# Patient Record
Sex: MALE | Race: WHITE
[De-identification: names, ages, dates, MRNs, and addresses within clinical notes are randomized per-mention and may not be internally consistent; named-entity substitution may affect disease eponyms.]

---

## 2020-10-10 ENCOUNTER — HOSPITAL ENCOUNTER (EMERGENCY)
Dept: HOSPITAL 75 - ER FS | Age: 60
Discharge: HOME | End: 2020-10-10
Payer: COMMERCIAL

## 2020-10-10 VITALS — BODY MASS INDEX: 26.45 KG/M2 | HEIGHT: 70.98 IN | WEIGHT: 188.94 LBS

## 2020-10-10 VITALS — SYSTOLIC BLOOD PRESSURE: 161 MMHG | DIASTOLIC BLOOD PRESSURE: 95 MMHG

## 2020-10-10 DIAGNOSIS — R51.9: ICD-10-CM

## 2020-10-10 DIAGNOSIS — I10: Primary | ICD-10-CM

## 2020-10-10 DIAGNOSIS — F17.210: ICD-10-CM

## 2020-10-10 LAB
ALBUMIN SERPL-MCNC: 4.5 GM/DL (ref 3.2–4.5)
ALP SERPL-CCNC: 83 U/L (ref 40–136)
ALT SERPL-CCNC: 25 U/L (ref 0–55)
BASOPHILS # BLD AUTO: 0.1 10^3/UL (ref 0–0.1)
BASOPHILS NFR BLD AUTO: 1 % (ref 0–10)
BILIRUB SERPL-MCNC: 0.3 MG/DL (ref 0.1–1)
BUN/CREAT SERPL: 22
CALCIUM SERPL-MCNC: 9.9 MG/DL (ref 8.5–10.1)
CHLORIDE SERPL-SCNC: 102 MMOL/L (ref 98–107)
CO2 SERPL-SCNC: 24 MMOL/L (ref 21–32)
CREAT SERPL-MCNC: 0.72 MG/DL (ref 0.6–1.3)
EOSINOPHIL # BLD AUTO: 0.2 10^3/UL (ref 0–0.3)
EOSINOPHIL NFR BLD AUTO: 3 % (ref 0–10)
GFR SERPLBLD BASED ON 1.73 SQ M-ARVRAT: > 60 ML/MIN
GLUCOSE SERPL-MCNC: 175 MG/DL (ref 70–105)
HCT VFR BLD CALC: 44 % (ref 40–54)
HGB BLD-MCNC: 15 G/DL (ref 13.3–17.7)
LYMPHOCYTES # BLD AUTO: 1.5 X 10^3 (ref 1–4)
LYMPHOCYTES NFR BLD AUTO: 25 % (ref 12–44)
MANUAL DIFFERENTIAL PERFORMED BLD QL: NO
MCH RBC QN AUTO: 29 PG (ref 25–34)
MCHC RBC AUTO-ENTMCNC: 34 G/DL (ref 32–36)
MCV RBC AUTO: 85 FL (ref 80–99)
MONOCYTES # BLD AUTO: 0.4 X 10^3 (ref 0–1)
MONOCYTES NFR BLD AUTO: 6 % (ref 0–12)
NEUTROPHILS # BLD AUTO: 4 X 10^3 (ref 1.8–7.8)
NEUTROPHILS NFR BLD AUTO: 65 % (ref 42–75)
PLATELET # BLD: 263 10^3/UL (ref 130–400)
PMV BLD AUTO: 9.3 FL (ref 7.4–10.4)
POTASSIUM SERPL-SCNC: 4 MMOL/L (ref 3.6–5)
PROT SERPL-MCNC: 7.6 GM/DL (ref 6.4–8.2)
SODIUM SERPL-SCNC: 139 MMOL/L (ref 135–145)
WBC # BLD AUTO: 6.1 10^3/UL (ref 4.3–11)

## 2020-10-10 PROCEDURE — 36415 COLL VENOUS BLD VENIPUNCTURE: CPT

## 2020-10-10 PROCEDURE — 85025 COMPLETE CBC W/AUTO DIFF WBC: CPT

## 2020-10-10 PROCEDURE — 70450 CT HEAD/BRAIN W/O DYE: CPT

## 2020-10-10 PROCEDURE — 80053 COMPREHEN METABOLIC PANEL: CPT

## 2020-10-10 NOTE — ED GENERAL
General


Chief Complaint:  Cardiac/General Problems


Stated Complaint:  HEADACHE, HIGH BP


Nursing Triage Note:  


Patient presents to the ED with c/o of high blood pressure and headaches. He 


states that he was seen at [facility] walk in clinic this morning and sent to the ED 


for further evaluation because his blood pressure was elevated. He also reports 


that he has been experiencing headaches every morning for the past 7 days.


Nursing Sepsis Screen:  No Definite Risk





History of Present Illness


Date Seen by Provider:  Oct 10, 2020


Time Seen by Provider:  12:14


Initial Comments


Patient presenting to emergency department for evaluation of a headache that has

been going on for approximately one week and he describes it as a diffuse 

pounding sensation throughout his entire head that he wakes up with it in the 

morning and then he takes ibuprofen and the pain goes away.  He says he doesn't 

have it all day again until before he goes to sleep and feels a headache again. 

He denies any other symptoms of fevers chills nausea vomiting vision changes 

neck stiffness unilateral weakness numbness or tingling.  He went to the urgent 

care and they told him to come here because his blood pressure was in the 

200/120 range.  He denies ever being diagnosed with hypertension denies having a

physician or having any diagnosed medical problems.  He denies any chest pain 

shortness of breath decreased urination or other symptoms associated with his 

hypertension.





Allergies and Home Medications


Allergies


Coded Allergies:  


     No Known Drug Allergies (Unverified , 10/10/20)





Home Medications


Lisinopril 40 Mg Tablet, 40 MG PO DAILY


   Prescribed by: GAYLE MONREAL on 10/10/20 1201





Patient Home Medication List


Home Medication List Reviewed:  Yes





Review of Systems


Review of Systems


Constitutional:  no symptoms reported


EENTM:  no symptoms reported


Respiratory:  no symptoms reported


Cardiovascular:  no symptoms reported


Gastrointestinal:  no symptoms reported


Genitourinary:  no symptoms reported


Musculoskeletal:  no symptoms reported


Skin:  no symptoms reported


Psychiatric/Neurological:  Headache





All Other Systems Reviewed


Negative Unless Noted:  Yes





Past Medical-Social-Family Hx


Patient Social History


Alcohol Use:  Rarely Uses


Recreational Drug Use:  No


Smoking Status:  Current Everyday Smoker


Type Used:  Cigarettes


2nd Hand Smoke Exposure:  No


Recent Foreign Travel:  No


Contact w/Someone Who Travel:  No


Recent Infectious Disease Expo:  No


Recent Hopitalizations:  No


Physical Abuse:  No


Sexual Abuse:  No


Mistreated:  No


Fear:  No





Seasonal Allergies


Seasonal Allergies:  No





Past Medical History


Surgeries:  Yes (Hernia repair)


Abdominal, Appendectomy


Respiratory:  No


Cardiac:  No


Neurological:  No


Genitourinary:  No


Gastrointestinal:  No


Musculoskeletal:  No


Endocrine:  No


HEENT:  No


Cancer:  No


Psychosocial:  No


Integumentary:  No


Blood Disorders:  No





Physical Exam


Vital Signs





Vital Signs - First Documented








 10/10/20





 10:43


 


Temp 36.4


 


Pulse 74


 


Resp 16


 


B/P (MAP) 193/112 (139)


 


Pulse Ox 98


 


O2 Delivery Room Air





Capillary Refill : Less Than 3 Seconds


Height, Weight, BMI


Height: '"


Weight: lbs. oz. kg; 26.00 BMI


Method:


General Appearance:  No Apparent Distress, WD/WN


HEENT:  PERRL/EOMI


Neck:  Supple


Respiratory:  No Respiratory Distress


Cardiovascular:  Regular Rate, Rhythm


Gastrointestinal:  Non Tender, Soft


Back:  No Vertebral Tenderness


Extremity:  Normal Capillary Refill


Neurologic/Psychiatric:  Alert, Oriented x3, No Motor/Sensory Deficits





Progress/Results/Core Measures


Suspected Sepsis


Recent Fever Within 48 Hours:  No


Infection Criteria Present:  None


New/Unexplained  Altered Menta:  No


Sepsis Screen:  No Definite Risk


SIRS


Temperature: 


Pulse: 74 


Respiratory Rate: 16


 


Laboratory Tests


10/10/20 11:20: White Blood Count 6.1


Blood Pressure 193 /112 


Mean: 139


 


Laboratory Tests


10/10/20 11:20: 


Creatinine 0.72, Platelet Count 263, Total Bilirubin 0.3








Results/Orders


Lab Results





Laboratory Tests








Test


 10/10/20


11:20 Range/Units


 


 


White Blood Count


 6.1 


 4.3-11.0


10^3/uL


 


Red Blood Count


 5.12 


 4.35-5.85


10^6/uL


 


Hemoglobin 15.0  13.3-17.7  G/DL


 


Hematocrit 44  40-54  %


 


Mean Corpuscular Volume 85  80-99  FL


 


Mean Corpuscular Hemoglobin 29  25-34  PG


 


Mean Corpuscular Hemoglobin


Concent 34 


 32-36  G/DL





 


Red Cell Distribution Width 13.3  10.0-14.5  %


 


Platelet Count


 263 


 130-400


10^3/uL


 


Mean Platelet Volume 9.3  7.4-10.4  FL


 


Immature Granulocyte % (Auto) 0   %


 


Neutrophils (%) (Auto) 65  42-75  %


 


Lymphocytes (%) (Auto) 25  12-44  %


 


Monocytes (%) (Auto) 6  0-12  %


 


Eosinophils (%) (Auto) 3  0-10  %


 


Basophils (%) (Auto) 1  0-10  %


 


Neutrophils # (Auto) 4.0  1.8-7.8  X 10^3


 


Lymphocytes # (Auto) 1.5  1.0-4.0  X 10^3


 


Monocytes # (Auto) 0.4  0.0-1.0  X 10^3


 


Eosinophils # (Auto)


 0.2 


 0.0-0.3


10^3/uL


 


Basophils # (Auto)


 0.1 


 0.0-0.1


10^3/uL


 


Immature Granulocyte # (Auto)


 0.0 


 0.0-0.1


10^3/uL


 


Sodium Level 139  135-145  MMOL/L


 


Potassium Level 4.0  3.6-5.0  MMOL/L


 


Chloride Level 102    MMOL/L


 


Carbon Dioxide Level 24  21-32  MMOL/L


 


Anion Gap 13  5-14  MMOL/L


 


Blood Urea Nitrogen 16  7-18  MG/DL


 


Creatinine


 0.72 


 0.60-1.30


MG/DL


 


Estimat Glomerular Filtration


Rate > 60 


  





 


BUN/Creatinine Ratio 22   


 


Glucose Level 175 H   MG/DL


 


Calcium Level 9.9  8.5-10.1  MG/DL


 


Corrected Calcium 9.5  8.5-10.1  MG/DL


 


Total Bilirubin 0.3  0.1-1.0  MG/DL


 


Aspartate Amino Transf


(AST/SGOT) 21 


 5-34  U/L





 


Alanine Aminotransferase


(ALT/SGPT) 25 


 0-55  U/L





 


Alkaline Phosphatase 83    U/L


 


Total Protein 7.6  6.4-8.2  GM/DL


 


Albumin 4.5  3.2-4.5  GM/DL








My Orders





Orders - GAYLE MONREAL DO


Ct Head Wo (10/10/20 10:50)


Iv/Invasive Line Insertion .IV start (10/10/20 10:50)


Cbc With Automated Diff (10/10/20 10:50)


Comprehensive Metabolic Panel (10/10/20 10:50)


Ketorolac Injection (Toradol Injection) (10/10/20 11:00)


Morphine  Injection (Morphine  Injection (10/10/20 10:50)


Ondansetron Injection (Zofran Injectio (10/10/20 11:00)


Enalaprilat Inj (Vasotec Inj) (10/10/20 11:00)


Lisinopril  Tablet (Zestril Tablet) (10/10/20 11:00)





Medications Given in ED





Current Medications








 Medications  Dose


 Ordered  Sig/Teresita


 Route  Start Time


 Stop Time Status Last Admin


Dose Admin


 


 Enalaprilat  2.5 mg  ONCE  ONCE


 IV  10/10/20 11:00


 10/10/20 11:01 DC 10/10/20 11:22


2.5 MG


 


 Ketorolac


 Tromethamine  15 mg  ONCE  ONCE


 IVP  10/10/20 11:00


 10/10/20 11:01 DC 10/10/20 11:22


15 MG


 


 Lisinopril  40 mg  ONCE  ONCE


 PO  10/10/20 11:00


 10/10/20 11:01 DC 10/10/20 11:21


40 MG








Vital Signs/I&O











 10/10/20 10/10/20





 10:43 12:06


 


Temp 36.4 36.4


 


Pulse 74 66


 


Resp 16 16


 


B/P (MAP) 193/112 (139) 161/95 (139)


 


Pulse Ox 98 96


 


O2 Delivery Room Air 





Capillary Refill : Less Than 3 Seconds








Blood Pressure Mean:                    139








Progress Note :  


Progress Note


Patient with a headache that I'm not entirely certain is related to his 

hypertension.  Patient given Toradol enalapril and lisinopril and his blood 

pressure improved to the 155/90 range and I did not want to drop his blood 

pressure any further.  Patient told to stop smoking and take a baby aspirin 

daily and follow with a primary care provider is a total of his blood pressure 

may be undertreated or overtreated and he will need short-term follow-up.  He 

says that he has pain into for Dr. self before in the past and he will try and 

arrange follow-up with him later this week.  Given patient appears well with 

normal vital signs benign physical exam and workup he'll be discharged in stable

condition.  Patient aware and agreeable with plan for discharge and verbalized 

understanding of the need for short-term follow-up and strict ED return 

precautions discussed.





Departure


Impression





   Primary Impression:  


   Asymptomatic hypertension


   Additional Impression:  


   Headache


Disposition:  01 HOME, SELF-CARE


Condition:  Stable





Departure-Patient Inst.


Referrals:  


MAURICE,ALEK CAMARILLO


Patient Instructions:  High Blood Pressure (DC)





Add. Discharge Instructions:  


Stop smoking.  Take an 81mg aspirin daily.  Drink plenty of fluids.  Follow with

Dr. Bhakta this week and come back with any concerns.  Thank you!





All discharge instructions reviewed with patient and/or family. Voiced 

understanding.


Scripts


Lisinopril (Lisinopril) 40 Mg Tablet


40 MG PO DAILY for 30 Days, TAB


   Prov: GAYLE MONREAL DO         10/10/20











GAYLE MONREAL DO             Oct 10, 2020 12:01

## 2020-10-10 NOTE — DIAGNOSTIC IMAGING REPORT
EXAMINATION: CT head without contrast.



TECHNIQUE: Multiple contiguous axial images were obtained through

the brain without the use of intravenous contrast. All CT scans

use one or more of the following dose optimizing techniques:

automated exposure control, MA and/or KvP adjustment based on a

patient size and exam type, or iterative reconstruction. 



HISTORY: headache x 7days



COMPARISON: None available.



FINDINGS: 



The ventricles and sulci are normal. No abnormal attenuation of

brain parenchyma is present. No acute intracranial hemorrhage or

abnormal extra-axial fluid collections are present. 



Calcification of the intracranial ICAs. No hyperdense vessel.



The calvarium is intact. Mucosal thickening of the ethmoid

sinuses. The visualized paranasal sinuses are clear. The orbits

are normal.



IMPRESSION:



1. No acute intracranial abnormality.



Dictated by: 



  Dictated on workstation # [id number]

## 2022-03-02 ENCOUNTER — HOSPITAL ENCOUNTER (OUTPATIENT)
Dept: HOSPITAL 75 - PREOP | Age: 62
Discharge: HOME | End: 2022-03-02
Attending: SURGERY
Payer: COMMERCIAL

## 2022-03-02 VITALS — WEIGHT: 189.6 LBS | HEIGHT: 70.87 IN | BODY MASS INDEX: 26.54 KG/M2

## 2022-03-02 DIAGNOSIS — Z01.818: Primary | ICD-10-CM

## 2022-03-09 ENCOUNTER — HOSPITAL ENCOUNTER (OUTPATIENT)
Dept: HOSPITAL 75 - ENDO | Age: 62
Discharge: HOME | End: 2022-03-09
Attending: SURGERY
Payer: COMMERCIAL

## 2022-03-09 VITALS — SYSTOLIC BLOOD PRESSURE: 138 MMHG | DIASTOLIC BLOOD PRESSURE: 87 MMHG

## 2022-03-09 VITALS — DIASTOLIC BLOOD PRESSURE: 80 MMHG | SYSTOLIC BLOOD PRESSURE: 147 MMHG

## 2022-03-09 VITALS — SYSTOLIC BLOOD PRESSURE: 146 MMHG | DIASTOLIC BLOOD PRESSURE: 80 MMHG

## 2022-03-09 VITALS — SYSTOLIC BLOOD PRESSURE: 118 MMHG | DIASTOLIC BLOOD PRESSURE: 89 MMHG

## 2022-03-09 VITALS — BODY MASS INDEX: 26.54 KG/M2 | WEIGHT: 189.6 LBS | HEIGHT: 70.87 IN

## 2022-03-09 DIAGNOSIS — Z90.89: ICD-10-CM

## 2022-03-09 DIAGNOSIS — D12.8: ICD-10-CM

## 2022-03-09 DIAGNOSIS — D12.9: ICD-10-CM

## 2022-03-09 DIAGNOSIS — K64.1: ICD-10-CM

## 2022-03-09 DIAGNOSIS — Z79.82: ICD-10-CM

## 2022-03-09 DIAGNOSIS — D12.3: Primary | ICD-10-CM

## 2022-03-09 DIAGNOSIS — Z98.890: ICD-10-CM

## 2022-03-09 NOTE — PROGRESS NOTE-POST OPERATIVE
Post-Operative Progess Note


Surgeon (s)/Assistant (s)


Surgeon


ZELDA ALEJO MD


Assistant:  none





Pre-Operative Diagnosis


screening colo, sx anal cyst





Post-Operative Diagnosis





anal inclusion cyst(1cm), large peduculated rectal polyp(1.5cm), small


splenic flexure polyp.





Procedure & Operative Findings


Date of Procedure


3/9/22


Procedure Performed/Findings


excision anal cyst(1cm), snare polypectomy rectal polyp, splenic flex 

polypectomy with hot bx forcep


Anesthesia Type


mac





Estimated Blood Loss


Estimated blood loss (mL):  minimal





Specimens/Packing


Specimens Removed


anal lesion, rectal polyp, splenic flex polyp











ZELDA ALEJO MD                 Mar 9, 2022 12:25

## 2022-03-09 NOTE — OPERATIVE REPORT
DATE OF SERVICE:  03/09/2022



PREOPERATIVE DIAGNOSIS:

Screening colonoscopy, rectal bleeding.



POSTOPERATIVE DIAGNOSES:

Perianal cyst, 1 cm in size; pedunculated polyp of the rectum 1.5 cm in size;

small polyp of the splenic flexure approximately 4 mm in size.  Chronic stage II

external and internal hemorrhoids.



PROCEDURE:

Excision external perianal cyst, 1 cm in size, excision of rectal polyp, snare

polypectomy, removal of splenic flexure polyp with a hot biopsy forceps.



SURGEON:

Zelda Alejo MD.



ANESTHESIA:

Monitored anesthesia care with local.



ESTIMATED BLOOD LOSS:

Minimal.



FINDINGS:

Perianal cyst, 1 cm in size; pedunculated polyp of the rectum 1.5 cm in size;

small polyp of the splenic flexure approximately 4 mm in size.  Chronic stage II

external and internal hemorrhoids.



DISPOSITION:

The patient tolerated the procedure well.



INDICATIONS:

The patient is a 61-year-old male in need of a screening colonoscopy.  He has

not had a colonoscopy up to this point in his life.  He reports that he has had

some episodes of constipation in the past.  He also does report a burning and

irritation in the perianal region, which would sometimes bleed.  He also does

have hemorrhoids.  He does not report any first-degree family history of colon

cancer.



DESCRIPTION OF PROCEDURE:

The patient was brought to the endoscopy suite, laid in the left lateral

decubitus position.  Chronic stage II external and internal hemorrhoids were

identified.  There was also a perianal cyst approximately 1 cm in size, which

was anterior.  Digital rectal examination was performed and a palpable mass was

identified, likely consistent with a polyp.  Prostate gland was palpable and

appeared normal.



The endoscope was then intubated and anus and rectum gently insufflated.  The

endoscope was advanced through the anal canal where a significant sized

pedunculated polyp identified.  This was completely excised at its base using a

snare polypectomy and sent to pathology with visualization of good hemostasis. 

The endoscope was then advanced through the sigmoid colon where no

diverticulosis identified.  The endoscope was then advanced through the splenic

flexure where a small polyp approximately 4 mm in size was identified.  This was

biopsied and destroyed with forceps and electrocautery with visualization of

good hemostasis.  The endoscope was then advanced through the remainder of the

descending, transverse and ascending colon to the cecum, which were normal. 

Endoscope was then slowly withdrawn while taking a second look and suctioning of

residual air with no additional findings.



The anal cyst was then excised.  The cyst was anesthetized using 1% lidocaine

with epinephrine.  The cyst was then excised using a 15 blade, identified the

capsule and removing it intact.  Good hemostasis was observed and the anoderm

was then reapproximated using 3-0 nylon sutures.  Good hemostasis was observed. 

Wound was then cleaned and covered with gauze.



The patient tolerated the procedure well.  We will await the biopsy results;

however, he will need to proceed with a high fiber diet with at least 30 grams

of fiber daily as well as significant amounts of water to promote soft stools on

a daily basis.  He will likely also need to follow up colonoscopy based on the

characteristics of the lesion being greater than 1 cm as well as having what

appears to be a villous component to the lesion.





Job ID: [id number]

DocumentID: [id number]

Dictated Date:  03/09/2022 12:19:47

Transcription Date: 03/09/2022 18:32:47

Dictated By: ZELDA ALEJO MD

## 2022-03-09 NOTE — DISCHARGE INST-SURGICAL
D/C Lap Instructions-[facility]


Follow Up 





Activity as tolerated





High Fiber Diet 25g or more per day





Avoid Alcohol, Caffeine, Spicy Greasy and Acid foods.





Drink 64 fluid oz or more of fluids per day.





Symptoms to Report: Fever over 101 degree F, Nausea/Vomiting 


If any problems/questions: Contact your physician or go to Emergency Room











ZELDA ALEJO MD                 Mar 9, 2022 10:38

## 2022-03-09 NOTE — PROGRESS NOTE-PRE OPERATIVE
Pre-Operative Progress Note


H&P Reviewed


The H&P was reviewed, patient examined and no changes noted.


Date Seen by Provider:  Mar 9, 2022


Time Seen by Provider:  10:00


Date H&P Reviewed:  Mar 9, 2022


Time H&P Reviewed:  10:00


Pre-Operative Diagnosis:  screening colo, sx anal cyst











ZELDA ALEJO MD                 Mar 9, 2022 10:37

## 2022-03-09 NOTE — ANESTHESIA-GENERAL POST-OP
MAC


Patient Condition


Mental Status/LOC:  Same as Preop


Cardiovascular:  Satisfactory


Nausea/Vomiting:  Absent


Respiratory:  Satisfactory


Pain:  Controlled


Complications:  Absent





Post Op Complications


Complications


None





Follow Up Care/Instructions


Patient Instructions


None needed.





Anesthesiology Discharge Order


Discharge Order


Patient is doing well, no complaints, stable vital signs, no apparent adverse 

anesthesia problems.











RICK GARRISON DO          Mar 9, 2022 12:23

## 2023-05-16 ENCOUNTER — HOSPITAL ENCOUNTER (INPATIENT)
Dept: HOSPITAL 75 - ER FS | Age: 63
LOS: 1 days | Discharge: ANOTHER HOSPITAL | DRG: 282 | End: 2023-05-17
Attending: INTERNAL MEDICINE | Admitting: INTERNAL MEDICINE
Payer: COMMERCIAL

## 2023-05-16 VITALS — WEIGHT: 190.48 LBS | BODY MASS INDEX: 26.67 KG/M2 | HEIGHT: 70.98 IN

## 2023-05-16 DIAGNOSIS — I21.4: Primary | ICD-10-CM

## 2023-05-16 DIAGNOSIS — R73.9: ICD-10-CM

## 2023-05-16 DIAGNOSIS — E78.00: ICD-10-CM

## 2023-05-16 DIAGNOSIS — I10: ICD-10-CM

## 2023-05-16 DIAGNOSIS — I25.110: ICD-10-CM

## 2023-05-16 DIAGNOSIS — I45.10: ICD-10-CM

## 2023-05-16 DIAGNOSIS — F17.210: ICD-10-CM

## 2023-05-16 DIAGNOSIS — F12.10: ICD-10-CM

## 2023-05-16 LAB
ALBUMIN SERPL-MCNC: 4.3 GM/DL (ref 3.2–4.5)
ALP SERPL-CCNC: 85 U/L (ref 40–136)
ALT SERPL-CCNC: 21 U/L (ref 0–55)
AMORPH SED URNS QL MICRO: (no result) /LPF
APTT BLD: 35 SEC (ref 24–35)
APTT PPP: YELLOW S
BACTERIA #/AREA URNS HPF: NEGATIVE /HPF
BARBITURATES UR QL: NEGATIVE
BASOPHILS # BLD AUTO: 0.1 10^3/UL (ref 0–0.1)
BASOPHILS NFR BLD AUTO: 1 % (ref 0–10)
BENZODIAZ UR QL SCN: NEGATIVE
BILIRUB SERPL-MCNC: 0.3 MG/DL (ref 0.1–1)
BILIRUB UR QL STRIP: NEGATIVE
BUN/CREAT SERPL: 24
CALCIUM SERPL-MCNC: 9.7 MG/DL (ref 8.5–10.1)
CHLORIDE SERPL-SCNC: 105 MMOL/L (ref 98–107)
CO2 SERPL-SCNC: 24 MMOL/L (ref 21–32)
COCAINE UR QL: NEGATIVE
CREAT SERPL-MCNC: 0.86 MG/DL (ref 0.6–1.3)
EOSINOPHIL # BLD AUTO: 0.2 10^3/UL (ref 0–0.3)
EOSINOPHIL NFR BLD AUTO: 2 % (ref 0–10)
FIBRINOGEN PPP-MCNC: CLEAR MG/DL
GFR SERPLBLD BASED ON 1.73 SQ M-ARVRAT: 97 ML/MIN
GLUCOSE SERPL-MCNC: 121 MG/DL (ref 70–105)
GLUCOSE UR STRIP-MCNC: NEGATIVE MG/DL
HCT VFR BLD CALC: 41 % (ref 40–54)
HGB BLD-MCNC: 13.9 G/DL (ref 13.3–17.7)
INR PPP: 0.9 (ref 0.8–1.4)
KETONES UR QL STRIP: NEGATIVE
LEUKOCYTE ESTERASE UR QL STRIP: NEGATIVE
LYMPHOCYTES # BLD AUTO: 1.9 10^3/UL (ref 1–4)
LYMPHOCYTES NFR BLD AUTO: 22 % (ref 12–44)
MAGNESIUM SERPL-MCNC: 2.2 MG/DL (ref 1.6–2.4)
MANUAL DIFFERENTIAL PERFORMED BLD QL: NO
MCH RBC QN AUTO: 29 PG (ref 25–34)
MCHC RBC AUTO-ENTMCNC: 34 G/DL (ref 32–36)
MCV RBC AUTO: 85 FL (ref 80–99)
METHADONE UR QL SCN: NEGATIVE
MONOCYTES # BLD AUTO: 1.1 10^3/UL (ref 0–1)
MONOCYTES NFR BLD AUTO: 13 % (ref 0–12)
NEUTROPHILS # BLD AUTO: 5.3 10^3/UL (ref 1.8–7.8)
NEUTROPHILS NFR BLD AUTO: 62 % (ref 42–75)
NITRITE UR QL STRIP: NEGATIVE
OPIATES UR QL SCN: NEGATIVE
OXYCODONE UR QL: NEGATIVE
PH UR STRIP: 6.5 [PH] (ref 5–9)
PLATELET # BLD: 228 10^3/UL (ref 130–400)
PMV BLD AUTO: 9.7 FL (ref 9–12.2)
POTASSIUM SERPL-SCNC: 4 MMOL/L (ref 3.6–5)
PROPOXYPH UR QL: NEGATIVE
PROT SERPL-MCNC: 7.4 GM/DL (ref 6.4–8.2)
PROT UR QL STRIP: NEGATIVE
PROTHROMBIN TIME: 12.6 SEC (ref 12.2–14.7)
RBC #/AREA URNS HPF: (no result) /HPF
SODIUM SERPL-SCNC: 141 MMOL/L (ref 135–145)
SP GR UR STRIP: 1.02 (ref 1.02–1.02)
SQUAMOUS #/AREA URNS HPF: (no result) /HPF
TRICYCLICS UR QL SCN: NEGATIVE
WBC # BLD AUTO: 8.5 10^3/UL (ref 4.3–11)
WBC #/AREA URNS HPF: (no result) /HPF

## 2023-05-16 PROCEDURE — 93306 TTE W/DOPPLER COMPLETE: CPT

## 2023-05-16 PROCEDURE — 83036 HEMOGLOBIN GLYCOSYLATED A1C: CPT

## 2023-05-16 PROCEDURE — 87081 CULTURE SCREEN ONLY: CPT

## 2023-05-16 PROCEDURE — 85730 THROMBOPLASTIN TIME PARTIAL: CPT

## 2023-05-16 PROCEDURE — 83735 ASSAY OF MAGNESIUM: CPT

## 2023-05-16 PROCEDURE — 84484 ASSAY OF TROPONIN QUANT: CPT

## 2023-05-16 PROCEDURE — 93041 RHYTHM ECG TRACING: CPT

## 2023-05-16 PROCEDURE — 81000 URINALYSIS NONAUTO W/SCOPE: CPT

## 2023-05-16 PROCEDURE — 71045 X-RAY EXAM CHEST 1 VIEW: CPT

## 2023-05-16 PROCEDURE — 85025 COMPLETE CBC W/AUTO DIFF WBC: CPT

## 2023-05-16 PROCEDURE — 36415 COLL VENOUS BLD VENIPUNCTURE: CPT

## 2023-05-16 PROCEDURE — 85610 PROTHROMBIN TIME: CPT

## 2023-05-16 PROCEDURE — 93458 L HRT ARTERY/VENTRICLE ANGIO: CPT

## 2023-05-16 PROCEDURE — 80306 DRUG TEST PRSMV INSTRMNT: CPT

## 2023-05-16 PROCEDURE — 36221 PLACE CATH THORACIC AORTA: CPT

## 2023-05-16 PROCEDURE — 80053 COMPREHEN METABOLIC PANEL: CPT

## 2023-05-16 PROCEDURE — 83880 ASSAY OF NATRIURETIC PEPTIDE: CPT

## 2023-05-16 PROCEDURE — 80048 BASIC METABOLIC PNL TOTAL CA: CPT

## 2023-05-16 PROCEDURE — 85379 FIBRIN DEGRADATION QUANT: CPT

## 2023-05-16 PROCEDURE — 84100 ASSAY OF PHOSPHORUS: CPT

## 2023-05-16 PROCEDURE — 93005 ELECTROCARDIOGRAM TRACING: CPT

## 2023-05-16 PROCEDURE — 80061 LIPID PANEL: CPT

## 2023-05-16 RX ADMIN — NITROGLYCERIN PRN MG: 0.4 TABLET SUBLINGUAL at 20:45

## 2023-05-16 RX ADMIN — NITROGLYCERIN PRN MG: 0.4 TABLET SUBLINGUAL at 20:40

## 2023-05-16 NOTE — ED CHEST PAIN
General


Chief Complaint:  Chest Pain


Stated Complaint:  CP,PAIN IN BOTH ARMS


Nursing Triage Note:  


PATIENT REPORTS CHEST PAIN OVER THE LAST FEW MONTHS. DENIES BEING TREATED FOR 


THIS. STATES DID QUIT SMOKING 6 DAYS AGO.PATIENT STATES PAIN WORSENS WITH 


ACTIVITY, SUCH AS CLIMBING STAIRS OR MOWING LAWN.





History of Present Illness


Date Seen by Provider:  May 16, 2023


Time Seen by Provider:  19:52


Initial Comments


62-year-old male with PMH of HTN on Lisinopril, and quit smoking 5 days ago, is 

here with complaints of chest pain that has been ongoing for over 1 month.  

Patient does not have any cardiac history.  Patient's parents do not have any 

cardiac history however his mother passed away from a stroke.  Patient states 

that the chest pain is mostly upper part of the chest without any radiation, and

is aggravated by walking up stairs or walking long distances and is associated 

with shortness of breath at that time.  Patient's last PCP visit was 6 months 

ago.  Denies fever and chills, respiratory infections, cough, palpitations, leg 

swelling, abdominal pain, diaphoresis, diarrhea, nausea and vomiting.  Patient 

took 4 aspirin at home prior to coming to the ER.





Allergies and Home Medications


Allergies


Coded Allergies:  


     No Known Drug Allergies (Unverified , 10/10/20)





Patient Home Medication List


Home Medication List Reviewed:  Yes


Lisinopril (Lisinopril) 40 Mg Tablet, 40 MG PO DAILY


   Prescribed by: GAYLE MONREAL on 10/10/20 1201





Review of Systems


Review of Systems


Constitutional:  no symptoms reported, see HPI


EENTM:  No Symptoms Reported


Respiratory:  See HPI, SOA With Exertion


Cardiovascular:  Chest Pain


Gastrointestinal:  No Symptoms Reported


Genitourinary:  No Symptoms Reported


Musculoskeletal:  no symptoms reported


Skin:  no symptoms reported


Psychiatric/Neurological:  No Symptoms Reported


Endocrine:  No Symptoms Reported


Hematologic/Lymphatic:  No Symptoms Reported





Past Medical-Social-Family Hx


Patient Social History


Tobacco Use?:  Yes


Tobacco type used:  Cigarettes


Smoking Status:  Former Smoker





Immunizations Up To Date


Influenza Vaccine Up-to-Date:  No; Not Current


First/Initial COVID19 Vaccinat:  


Second COVID19 Vaccination Larry:  none


Third COVID19 Vaccination Date:  NO





Seasonal Allergies


Seasonal Allergies:  No





Past Medical History


Surgery/Hospitalization HX:  


HTN


Surgeries:  Yes (Hernia repair)


Abdominal, Appendectomy, Tonsillectomy


Respiratory:  No


Cardiac:  Yes


Hypertension


Neurological:  No


Genitourinary:  No


Gastrointestinal:  No


Musculoskeletal:  No


Endocrine:  No


HEENT:  No


Cancer:  No


Psychosocial:  No


Integumentary:  No


Blood Disorders:  No


Adverse Reaction/Blood Tranf:  No





Physical Exam


Vital Signs





Vital Signs - First Documented








 23





 19:51


 


Temp 37.0


 


Pulse 85


 


Resp 20


 


B/P (MAP) 142/92 (109)


 


Pulse Ox 98


 


O2 Delivery Room Air





Capillary Refill : Less Than 3 Seconds


Height, Weight, BMI


Height: '"


Weight: lbs. oz. kg; 26.00 BMI


Method:


General Appearance:  No Apparent Distress, WD/WN, Anxious


HEENT:  PERRL/EOMI


Neck:  Full Range of Motion


Respiratory:  Chest Non Tender, Lungs Clear, Normal Breath Sounds, No Accessory 

Muscle Use, No Respiratory Distress


Cardiovascular:  Regular Rate, Rhythm, No Edema


Gastrointestinal:  Normal Bowel Sounds, Non Tender, Soft


Extremity:  Normal Range of Motion


Neurologic/Psychiatric:  Alert, Oriented x3, No Motor/Sensory Deficits, Normal 

Mood/Affect


Skin:  Normal Color





Progress/Results/Core Measures


Results/Orders


Lab Results





Laboratory Tests








Test


 23


19:54 23


20:50 23


21:32 Range/Units


 


 


White Blood Count


 8.5 


 


 


 4.3-11.0


10^3/uL


 


Red Blood Count


 4.84 


 


 


 4.30-5.52


10^6/uL


 


Hemoglobin 13.9    13.3-17.7  g/dL


 


Hematocrit 41    40-54  %


 


Mean Corpuscular Volume 85    80-99  fL


 


Mean Corpuscular Hemoglobin 29    25-34  pg


 


Mean Corpuscular Hemoglobin


Concent 34 


 


 


 32-36  g/dL





 


Red Cell Distribution Width 13.6    10.0-14.5  %


 


Platelet Count


 228 


 


 


 130-400


10^3/uL


 


Mean Platelet Volume 9.7    9.0-12.2  fL


 


Immature Granulocyte % (Auto) 0     %


 


Neutrophils (%) (Auto) 62    42-75  %


 


Lymphocytes (%) (Auto) 22    12-44  %


 


Monocytes (%) (Auto) 13 H   0-12  %


 


Eosinophils (%) (Auto) 2    0-10  %


 


Basophils (%) (Auto) 1    0-10  %


 


Neutrophils # (Auto)


 5.3 


 


 


 1.8-7.8


10^3/uL


 


Lymphocytes # (Auto)


 1.9 


 


 


 1.0-4.0


10^3/uL


 


Monocytes # (Auto)


 1.1 H


 


 


 0.0-1.0


10^3/uL


 


Eosinophils # (Auto)


 0.2 


 


 


 0.0-0.3


10^3/uL


 


Basophils # (Auto)


 0.1 


 


 


 0.0-0.1


10^3/uL


 


Immature Granulocyte # (Auto)


 0.0 


 


 


 0.0-0.1


10^3/uL


 


Prothrombin Time 12.6    12.2-14.7  SEC


 


INR Comment 0.9    0.8-1.4  


 


Activated Partial


Thromboplast Time 35 


 


 


 24-35  SEC





 


D-Dimer


 0.34 


 


 


 0.00-0.49


UG/ML


 


Sodium Level 141    135-145  MMOL/L


 


Potassium Level 4.0    3.6-5.0  MMOL/L


 


Chloride Level 105      MMOL/L


 


Carbon Dioxide Level 24    21-32  MMOL/L


 


Anion Gap 12    5-14  MMOL/L


 


Blood Urea Nitrogen 21 H   7-18  MG/DL


 


Creatinine


 0.86 


 


 


 0.60-1.30


MG/DL


 


Estimat Glomerular Filtration


Rate 97 


 


 


  





 


BUN/Creatinine Ratio 24     


 


Glucose Level 121 H     MG/DL


 


Calcium Level 9.7    8.5-10.1  MG/DL


 


Corrected Calcium 9.5    8.5-10.1  MG/DL


 


Magnesium Level 2.2    1.6-2.4  MG/DL


 


Total Bilirubin 0.3    0.1-1.0  MG/DL


 


Aspartate Amino Transf


(AST/SGOT) 42 H


 


 


 5-34  U/L





 


Alanine Aminotransferase


(ALT/SGPT) 21 


 


 


 0-55  U/L





 


Alkaline Phosphatase 85      U/L


 


Troponin I 3.00 *H 3.19 *H  <0.30  NG/ML


 


Pro-B-Type Natriuretic Peptide 975.1 H   <125.0  PG/ML


 


Total Protein 7.4    6.4-8.2  GM/DL


 


Albumin 4.3    3.2-4.5  GM/DL


 


Urine Color   YELLOW   


 


Urine Clarity   CLEAR   


 


Urine pH   6.5  5-9  


 


Urine Specific Gravity   1.020  1.016-1.022  


 


Urine Protein   NEGATIVE  NEGATIVE  


 


Urine Glucose (UA)   NEGATIVE  NEGATIVE  


 


Urine Ketones   NEGATIVE  NEGATIVE  


 


Urine Nitrite   NEGATIVE  NEGATIVE  


 


Urine Bilirubin   NEGATIVE  NEGATIVE  


 


Urine Urobilinogen   0.2  < = 1.0  MG/DL


 


Urine Leukocyte Esterase   NEGATIVE  NEGATIVE  


 


Urine RBC (Auto)   TRACE-I H NEGATIVE  


 


Urine RBC   2-5 H  /HPF


 


Urine WBC   RARE   /HPF


 


Urine Squamous Epithelial


Cells 


 


 NONE 


  /HPF





 


Urine Crystals   PRESENT H  /LPF


 


Urine Amorphous Sediment


 


 


 FEW TETO


URATES H  /LPF





 


Urine Bacteria   NEGATIVE   /HPF


 


Urine Casts   NONE   /LPF


 


Urine Mucus   SMALL H  /LPF


 


Urine Culture Indicated   NO   


 


Urine Opiates Screen   NEGATIVE  NEGATIVE  


 


Urine Oxycodone Screen   NEGATIVE  NEGATIVE  


 


Urine Methadone Screen   NEGATIVE  NEGATIVE  


 


Urine Propoxyphene Screen   NEGATIVE  NEGATIVE  


 


Urine Barbiturates Screen   NEGATIVE  NEGATIVE  


 


Ur Tricyclic Antidepressants


Screen 


 


 NEGATIVE 


 NEGATIVE  





 


Urine Phencyclidine Screen   NEGATIVE  NEGATIVE  


 


Urine Amphetamines Screen   NEGATIVE  NEGATIVE  


 


Urine Methamphetamines Screen   NEGATIVE  NEGATIVE  


 


Urine Benzodiazepines Screen   NEGATIVE  NEGATIVE  


 


Urine Cocaine Screen   NEGATIVE  NEGATIVE  


 


Urine Cannabinoids Screen   POSITIVE H NEGATIVE  








My Orders





Orders - MOHIT ROSE MD


Continuous Ekg Monitoring (23 19:52)


Ekg Tracing (23 19:52)


Chest 1 View Ap/Pa Only (23 19:53)


Cbc With Automated Diff (23 19:56)


Comprehensive Metabolic Panel (23 19:56)


Drug Screen Stat (Urine) (23 19:56)


Magnesium (23 19:56)


Protime With Inr (23 19:56)


Partial Thromboplastin Time (23 19:56)


Ua Culture If Indicated (23 19:56)


Troponin I Fs (23 19:56)


Fibrin Degradation Products (23 20:07)


Probnp Fs (23 20:07)


Nitroglycerin 0.4 Mg Btl 25's (Nitrostat (23 20:30)


Troponin I Fs (23 20:51)


Nitroglycerin 0.4 Mg Btl 25's (Nitrostat (23 20:37)


Enoxaparin Injection (Lovenox Injection) (23 21:21)


Ed Admission (Communication) (23 21:26)





Medications Given in ED





Current Medications








 Medications  Dose


 Ordered  Sig/Teresita


 Route  Start Time


 Stop Time Status Last Admin


Dose Admin


 


 Nitroglycerin  0.4 mg  UD  PRN


 SL  23 20:30


    23 20:45


0.4 MG








Vital Signs/I&O











 23





 19:51


 


Temp 37.0


 


Pulse 85


 


Resp 20


 


B/P (MAP) 142/92 (109)


 


Pulse Ox 98


 


O2 Delivery Room Air








2





Blood Pressure Mean:                    109











Progress


Progress Note :  


Progress Note


1. NSTEMI


- CXR: no acute changes


- EKG: non-ischemic


- Troponin: 1st: elevated 3.00, 2nd troponin is 3.19


- D-dimer: normal


- CBC/CMP: unremarkable


- Pt took 4 tabs of ASA at home


- Sublingual nitro given in ER


- Lovenox 86mg STAT


- Discussed with cardiologist and will start pt on Lovenox, and also with 

hospitalist and will admit pt to ICU


2. MARIJUANA ABUSE:


- UA/ UDS: no infection , but marijuana positive


Initial ECG Impression Date:  May 16, 2023


Initial ECG Impression Time:  19:54


Initial ECG Rate:  83


Initial ECG Rhythm:  Normal Sinus


Initial ECG Intervals:  Normal


Initial ECG Impression:  1st Degree AV Block


Initial ECG Comparisson:  No Previous ECG Available





Diagnostic Imaging





   Diagonstic Imaging:  Xray


   Plain Films/CT/US/NM/MRI:  chest


Comments


                 [facility] [place], [place]





NAME:   KYLEGAYLE ESE


MED REC#:   [id number]


ACCOUNT#:   [id number]


PT STATUS:   REG ER


:   1960


PHYSICIAN:   MOHIT ROSE MD


ADMIT DATE:   23/ER FS


                                  ***Signed***


Date of Exam:23





CHEST 1 VIEW AP/PA ONLY








EXAMINATION: Chest 1 view





HISTORY: Chest pain.





COMPARISON: None available.





FINDINGS: 





The lung volumes are normal. No focal consolidation is seen. No


large pleural effusion or pneumothorax is seen. The


cardiomediastinal silhouette is normal in size and contour. No


acute osseous abnormality is seen.





IMPRESSION: 





1. No acute pleuroparenchymal process.





Dictated by: 





  Dictated on workstation # [id number]








Dict:   23


Trans:   23


SHAISTA [id number]





Interpreted by:     EDOUARD WALSH DO


Electronically signed by: EDOUARD WALSH DO 23





Departure


Communication (Admissions)


Time/Spoke to Admitting Phy:  21:20


Discussed with Dr. Salazar, hospitalist and will admit to ICU


Time/Spoke to Consulting Phy:  21:18


Discussed with cardiologist Dr. Muir, and will admit to hospitalist.  Will 

start Lovenox.





Impression





   Primary Impression:  


   NSTEMI (non-ST elevated myocardial infarction)


   Additional Impression:  


   Marijuana abuse


Disposition:  30 STILL A PATIENT


Condition:  Stable





Admissions


Decision to Admit Reason:  Admit from ER (General)


Decision to Admit/Date:  May 16, 2023


Time/Decision to Admit Time:  21:05





Departure-Patient Inst.


Referrals:  


NO,LOCAL PHYSICIAN (PCP/Family)


Primary Care Physician











MOHIT ROSE MD              May 16, 2023 20:25

## 2023-05-16 NOTE — DIAGNOSTIC IMAGING REPORT
EXAMINATION: Chest 1 view



HISTORY: Chest pain.



COMPARISON: None available.



FINDINGS: 



The lung volumes are normal. No focal consolidation is seen. No

large pleural effusion or pneumothorax is seen. The

cardiomediastinal silhouette is normal in size and contour. No

acute osseous abnormality is seen.



IMPRESSION: 



1. No acute pleuroparenchymal process.



Dictated by: 



  Dictated on workstation # [id number]

## 2023-05-17 VITALS — DIASTOLIC BLOOD PRESSURE: 78 MMHG | SYSTOLIC BLOOD PRESSURE: 129 MMHG

## 2023-05-17 LAB
BASOPHILS # BLD AUTO: 0.1 10^3/UL (ref 0–0.1)
BASOPHILS NFR BLD AUTO: 1 % (ref 0–10)
BUN/CREAT SERPL: 22
CALCIUM SERPL-MCNC: 9.2 MG/DL (ref 8.5–10.1)
CHLORIDE SERPL-SCNC: 108 MMOL/L (ref 98–107)
CHOLEST SERPL-MCNC: 170 MG/DL (ref ?–200)
CO2 SERPL-SCNC: 18 MMOL/L (ref 21–32)
CREAT SERPL-MCNC: 0.74 MG/DL (ref 0.6–1.3)
EOSINOPHIL # BLD AUTO: 0.2 10^3/UL (ref 0–0.3)
EOSINOPHIL NFR BLD AUTO: 2 % (ref 0–10)
GFR SERPLBLD BASED ON 1.73 SQ M-ARVRAT: 102 ML/MIN
GLUCOSE SERPL-MCNC: 93 MG/DL (ref 70–105)
HCT VFR BLD CALC: 39 % (ref 40–54)
HDLC SERPL-MCNC: 36 MG/DL (ref 40–60)
HGB BLD-MCNC: 13.2 G/DL (ref 13.3–17.7)
LYMPHOCYTES # BLD AUTO: 1.5 10^3/UL (ref 1–4)
LYMPHOCYTES NFR BLD AUTO: 20 % (ref 12–44)
MAGNESIUM SERPL-MCNC: 1.9 MG/DL (ref 1.6–2.4)
MANUAL DIFFERENTIAL PERFORMED BLD QL: NO
MCH RBC QN AUTO: 29 PG (ref 25–34)
MCHC RBC AUTO-ENTMCNC: 34 G/DL (ref 32–36)
MCV RBC AUTO: 86 FL (ref 80–99)
MONOCYTES # BLD AUTO: 0.9 10^3/UL (ref 0–1)
MONOCYTES NFR BLD AUTO: 12 % (ref 0–12)
NEUTROPHILS # BLD AUTO: 4.8 10^3/UL (ref 1.8–7.8)
NEUTROPHILS NFR BLD AUTO: 65 % (ref 42–75)
PHOSPHATE SERPL-MCNC: 3.3 MG/DL (ref 2.3–4.7)
PLATELET # BLD: 226 10^3/UL (ref 130–400)
PMV BLD AUTO: 10.2 FL (ref 9–12.2)
POTASSIUM SERPL-SCNC: 3.8 MMOL/L (ref 3.6–5)
SODIUM SERPL-SCNC: 139 MMOL/L (ref 135–145)
TRIGL SERPL-MCNC: 126 MG/DL (ref ?–150)
VLDLC SERPL CALC-MCNC: 25 MG/DL (ref 5–40)
WBC # BLD AUTO: 7.4 10^3/UL (ref 4.3–11)

## 2023-05-17 PROCEDURE — B2151ZZ FLUOROSCOPY OF LEFT HEART USING LOW OSMOLAR CONTRAST: ICD-10-PCS | Performed by: INTERNAL MEDICINE

## 2023-05-17 PROCEDURE — B2111ZZ FLUOROSCOPY OF MULTIPLE CORONARY ARTERIES USING LOW OSMOLAR CONTRAST: ICD-10-PCS | Performed by: INTERNAL MEDICINE

## 2023-05-17 PROCEDURE — B3101ZZ FLUOROSCOPY OF THORACIC AORTA USING LOW OSMOLAR CONTRAST: ICD-10-PCS | Performed by: INTERNAL MEDICINE

## 2023-05-17 PROCEDURE — 4A023N7 MEASUREMENT OF CARDIAC SAMPLING AND PRESSURE, LEFT HEART, PERCUTANEOUS APPROACH: ICD-10-PCS | Performed by: INTERNAL MEDICINE

## 2023-05-17 RX ADMIN — MAGNESIUM SULFATE IN DEXTROSE SCH MLS/HR: 10 INJECTION, SOLUTION INTRAVENOUS at 07:25

## 2023-05-17 RX ADMIN — POTASSIUM CHLORIDE SCH MLS/HR: 200 INJECTION, SOLUTION INTRAVENOUS at 08:36

## 2023-05-17 RX ADMIN — POTASSIUM CHLORIDE SCH MLS/HR: 200 INJECTION, SOLUTION INTRAVENOUS at 06:25

## 2023-05-17 RX ADMIN — MAGNESIUM SULFATE IN DEXTROSE SCH MLS/HR: 10 INJECTION, SOLUTION INTRAVENOUS at 06:25

## 2023-05-17 NOTE — CARDIAC CATH REPORT
Cardiac Cath Report


Physician (s)/Assistant (s)


Physician


SONI BRIGGS MD





Pre-Procedure Diagnosis


Pre-Procedure Diagnosis:  NSTMI





Post-Procedure Note


Procedure Start Date:  May 17, 2023


Name of Procedure:  


Left heart catheterization


Left ventriculogram


Aortic arch angiogram


Findings/Procedure Note


PROCEDURE NOTE:


62-year-old gentleman admitted with non-ST elevation myocardial infarction, has 

been having waxing and waning pain for 6 months became worse in the past 4 days.

 Came into the emergency room and emergency cardiac catheterization was advised.


After explaining the procedure to the patient, all pros and cons were explained,

all questions were answered.  The patient signed the consent and then he  was 

placed in the cardiac catheterization laboratory. Groin was prepped in SL 

fashion local anesthesia was used. Sheath placed in the right radial artery, 

Tiger catheter was advanced to the left ventricular cavity, pressure was 

measured, left ventriculogram was done, pullback LV to aorta, engage the right 

and left coronary system, angiogram was done.


At the end of the procedure the sheath was removed.  Vascular band was used





FINDINGS:





Hemodynamics 


/13, end-diastolic pressure of 13


Aorta 129/91 mean of 106





ANATOMY:


Left Main is free of obstructive disease


Left Anterior Descending has severe stenosis in the proximal and mid artery


Left Circumflex is totally occluded proximally, reconstructed by collaterals, 

moderate size artery


Right Coronary Artery is dominant artery with multiple segment of severe 

stenosis in the mid and distal right coronary artery


LV Gram was done showing normal left ventricular size, hypokinesia at the 

inferior wall, ejection fraction 50%


Aorta evaluation with aortic arch angiogram showed slightly prominent aortic 

arch, no dissection or aneurysm, normal origin of the brachiocephalic artery, 

left carotid and left subclavian arteries





CONCLUSION:


Severe multivessel coronary artery disease including severe proximal and mid 

LAD, total occlusion of the proximal circumflex artery reconstructed by 

collaterals, severe stenosis at the mid and distal right coronary artery, 

dominant right coronary system


Normal left ventricular size with mild hypokinesia of the inferior wall, 

ejection fraction 50%


Normal aortic arch





DISCUSSION AND RECOMMENDATION


Hospital course:


Patient has severe multivessel coronary artery disease, non-ST elevation 

myocardial infarction, he is currently stable, bypass surgery was advised.  I 

will arrange for transfer to a tertiary care center for evaluation for bypass 

surgery.


Patient was started on Lovenox and aspirin, I will add Lipitor 80 mg daily, 

beta-blockers and ACE inhibitor





Final diagnosis:


Non-ST elevation myocardial infarction


Coronary artery disease


Hypertension


Hyperlipidemia


Anesthesia Type:  Conscious Sedation


Estimated blood loss (mL):  10 ml


Contrast Amount:  65 ml





Post-Procedure Diagnosis


Post-operative diagnosis:  


Final diagnosis: 


Non-ST elevation myocardial infarction


Coronary artery disease


Hypertension


Hyperlipidemia











SONI BRIGGS MD              May 17, 2023 08:34

## 2023-05-17 NOTE — HISTORY & PHYSICAL
History of Present Illness


HPI/Chief Complaint


CC: Unstable angina with NSTEMI





HPI: This is a 62[facility]HM clinic patient of [facility] who presented to the ER with CP. 

Elevated troponin noted so protocol meds started per Dr Muir. Smoking hx for 50

years. Has HLP. Cardiac cath revealed multi-vessel disease and needs CABG.


Source:  patient


Exam Limitations:  no limitations


Date Seen


5/17/23


Time Seen by a Provider:  11:00


Attending Physician


Yesica Partida


PCP


Admitting Physician:


Denice Salazar DO 








Attending Physician:


Denice Salazar DO


Referring Physician





Date of Admission


May 17, 2023 at 00:34





Home Medications & Allergies


Home Medications


Reviewed patient Home Medication Reconciliation performed by pharmacy medication

reconciliations technician and/or nursing.


Patients Allergies have been reviewed.





Allergies





Allergies


Coded Allergies


  No Known Drug Allergies (Alqwpwnrom42/10/20)








Past Medical-Social-Family Hx


Past Med/Social Hx:  Reviewed Nursing Past Med/Soc Hx, Reviewed and Corrections 

made


Patient Social History


Marrital Status:  single


Employed/Student:  employed


Alcohol Use:  Regular Use


Smoking Status:  Current Everyday Smoker


Type Used:  Cigarettes


2nd Hand Smoke Exposure:  No


Recent Foreign Travel:  No


Contact w/other who traveled:  No


Recent Hopitalizations:  No





Seasonal Allergies


Seasonal Allergies:  No





Past Medical History


Surgeries:  Abdominal, Appendectomy, Tonsillectomy


Cardiac:  High Cholesterol, Hypertension


History of Blood Disorders:  No


Adverse Reaction to Blood [place]:  No





Review of Systems


Constitutional:  see HPI


Respiratory:  dyspnea on exertion


Cardiovascular:  chest pain





Physical Exam


Physical Exam


Vital Signs





Vital Signs - First Documented








 5/16/23





 19:51


 


Temp 37.0


 


Pulse 85


 


Resp 20


 


B/P (MAP) 142/92 (109)


 


Pulse Ox 98


 


O2 Delivery Room Air





Capillary Refill : Less Than 3 Seconds


Height, Weight, BMI


Height: '"


Weight: lbs. oz. kg; 26.57 BMI


Method:


General Appearance:  No Apparent Distress, WD/WN, Anxious, Chronically ill


HEENT:  PERRL/EOMI


Neck:  Full Range of Motion


Respiratory:  Chest Non Tender, Lungs Clear, Normal Breath Sounds, No Accessory 

Muscle Use, No Respiratory Distress


Cardiovascular:  Regular Rate, Rhythm, No Edema


Gastrointestinal:  Normal Bowel Sounds, Non Tender, Soft


Extremity:  Normal Range of Motion


Neurologic/Psychiatric:  Alert, Oriented x3, No Motor/Sensory Deficits, Normal 

Mood/Affect


Skin:  Normal Color





Results


Results/Procedures


Labs


Laboratory Tests


5/16/23 19:54








5/17/23 03:59








Patient resulted labs reviewed.





Assessment/Plan


Admission Diagnosis


Assessment:


Unstable angina


NSTEMI


Severe multi-vessel disease on cath needs CABG


HTN


HLP


Smoker





Plan:


CABG


Admission Status:  Inpatient Order (span 2 midnights)


Reason for Inpatient Admission:  


Angina





Clinical Quality Measures


AMI/AHF:


ASA po Prior to arrival:  DENICE Smith DO                May 17, 2023 05:16

## 2023-05-17 NOTE — CARDIAC PROCEDURE NOTE-CS/ASA
Pre-Procedure Note


Pre-Op Procedure Note


Date of Available H&P:  May 17, 2023


Date H&P Reviewed:  May 17, 2023


Time H&P Reviewed:  07:48


History & Physical:  H&P Reviewed, Patient Examed, No changes noted


Pre-Operative Diagnosis:  NSTMI





Moderate Sedation PreProcedure


Time


07:48





ASA Score


3














Airway 


 


Lungs 


 


Heart 


 


 ASA score


 


 ASA 1: a normal healthy patient


 


 ASA 2:  a patient with a mild systemic disease (mid diabetes, controlled 

hypertension, obesity 


 


 ASA 3:  a patient with a severe systemic disease that limits activity  (angina,

COPD, prior Myocardial infarction)


 


 ASA 4:  a patient with an incapacitating disease that is a constant threat to 

life (CHF, renal failure)


 


 ASA 5:  a moribund patient not expected to survive 24 hrs.  (ruptured aneurysm)


 


 ASA 6:  a declared brain-dead patient whose organs are being harvested.


 


 For emergent operations, add the letter E after the classification











Mallampati Classification


Grade 3





Sedation Plan


Analgesia, Amnesia, Plan communicated to team members, Discussed options with 

patient/fam, Discussed risks with patient/fam


The patient is an appropriate candidate to undergo the planned procedure, 

sedation, and anesthesia.





The patient immediately re-assessed prior to indication.











SONI BRIGGS MD              May 17, 2023 07:49

## 2023-05-17 NOTE — DISCHARGE SUMMARY
Diagnosis/Chief Complaint


Date of Admission


May 17, 2023 at 00:34


Date of Discharge





Discharge Date:  May 17, 2023


Discharge Diagnosis


NSTEMI


Multi-vessel disease in need of CABG





Discharge Summary


Discharge Physical Examination


Allergies:  


Coded Allergies:  


     No Known Drug Allergies (Unverified , 10/10/20)


Vitals & I&Os





Vital Signs








  Date Time  Temp Pulse Resp B/P (MAP) Pulse Ox O2 Delivery O2 Flow Rate FiO2


 


5/17/23 14:00  74 9 116/80 (92) 92 Room Air  


 


5/17/23 12:00 37.3       











Hospital Course


Was the Problem List Reviewed?:  Yes


see HPI and plan on H&P


Labs (last 24 hrs)


Laboratory Tests


5/16/23 19:54: 


White Blood Count 8.5, Red Blood Count 4.84, Hemoglobin 13.9, Hematocrit 41, 

Mean Corpuscular Volume 85, Mean Corpuscular Hemoglobin 29, Mean Corpuscular 

Hemoglobin Concent 34, Red Cell Distribution Width 13.6, Platelet Count 228, 

Mean Platelet Volume 9.7, Immature Granulocyte % (Auto) 0, Neutrophils (%) 

(Auto) 62, Lymphocytes (%) (Auto) 22, Monocytes (%) (Auto) 13H, Eosinophils (%) 

(Auto) 2, Basophils (%) (Auto) 1, Neutrophils # (Auto) 5.3, Lymphocytes # (Auto)

1.9, Monocytes # (Auto) 1.1H, Eosinophils # (Auto) 0.2, Basophils # (Auto) 0.1, 

Immature Granulocyte # (Auto) 0.0, Prothrombin Time 12.6, INR Comment 0.9, 

Activated Partial Thromboplast Time 35, D-Dimer 0.34, Sodium Level 141, 

Potassium Level 4.0, Chloride Level 105, Carbon Dioxide Level 24, Anion Gap 12, 

Blood Urea Nitrogen 21H, Creatinine 0.86, Estimat Glomerular Filtration Rate 97,

BUN/Creatinine Ratio 24, Glucose Level 121H, Calcium Level 9.7, Corrected 

Calcium 9.5, Magnesium Level 2.2, Total Bilirubin 0.3, Aspartate Amino Transf 

(AST/SGOT) 42H, Alanine Aminotransferase (ALT/SGPT) 21, Alkaline Phosphatase 85,

Troponin I 3.00*H, Pro-B-Type Natriuretic Peptide 975.1H, Total Protein 7.4, 

Albumin 4.3


5/16/23 20:50: Troponin I 3.19*H


5/16/23 21:32: 


Urine Color YELLOW, Urine Clarity CLEAR, Urine pH 6.5, Urine Specific Gravity 

1.020, Urine Protein NEGATIVE, Urine Glucose (UA) NEGATIVE, Urine Ketones 

NEGATIVE, Urine Nitrite NEGATIVE, Urine Bilirubin NEGATIVE, Urine Urobilinogen 

0.2, Urine Leukocyte Esterase NEGATIVE, Urine RBC (Auto) TRACE-IH, Urine RBC 2-

5H, Urine WBC RARE, Urine Squamous Epithelial Cells NONE, Urine Crystals PRESE

NTH, Urine Amorphous Sediment FEW TETO URATESH, Urine Bacteria NEGATIVE, Urine 

Casts NONE, Urine Mucus SMALLH, Urine Culture Indicated NO, Urine Opiates Screen

NEGATIVE, Urine Oxycodone Screen NEGATIVE, Urine Methadone Screen NEGATIVE, 

Urine Propoxyphene Screen NEGATIVE, Urine Barbiturates Screen NEGATIVE, Ur 

Tricyclic Antidepressants Screen NEGATIVE, Urine Phencyclidine Screen NEGATIVE, 

Urine Amphetamines Screen NEGATIVE, Urine Methamphetamines Screen NEGATIVE, 

Urine Benzodiazepines Screen NEGATIVE, Urine Cocaine Screen NEGATIVE, Urine 

Cannabinoids Screen POSITIVEH


5/17/23 01:35: Troponin I 6.244*H


5/17/23 03:59: 


White Blood Count 7.4, Red Blood Count 4.55, Hemoglobin 13.2L, Hematocrit 39L, 

Mean Corpuscular Volume 86, Mean Corpuscular Hemoglobin 29, Mean Corpuscular He

moglobin Concent 34, Red Cell Distribution Width 13.8, Platelet Count 226, Mean 

Platelet Volume 10.2, Immature Granulocyte % (Auto) 0, Neutrophils (%) (Auto) 

65, Lymphocytes (%) (Auto) 20, Monocytes (%) (Auto) 12, Eosinophils (%) (Auto) 

2, Basophils (%) (Auto) 1, Neutrophils # (Auto) 4.8, Lymphocytes # (Auto) 1.5, 

Monocytes # (Auto) 0.9, Eosinophils # (Auto) 0.2, Basophils # (Auto) 0.1, 

Immature Granulocyte # (Auto) 0.0, Sodium Level 139, Potassium Level 3.8, 

Chloride Level 108H, Carbon Dioxide Level 18L, Anion Gap 13, Blood Urea Nitrogen

16, Creatinine 0.74, Estimat Glomerular Filtration Rate 102, BUN/Creatinine 

Ratio 22, Glucose Level 93, Calcium Level 9.2, Phosphorus Level 3.3, Magnesium 

Level 1.9, Troponin I 6.157*H, Triglycerides Level 126, Cholesterol Level 170, 

LDL Cholesterol Direct 112, VLDL Cholesterol 25, HDL Cholesterol 36L


5/17/23 08:10: Troponin I 7.036*H


5/17/23 11:58: Troponin I 8.845*H





Pending Labs


Laboratory Tests


5/16/23 19:54: 


White Blood Count 8.5, Red Blood Count 4.84, Hemoglobin 13.9, Hematocrit 41, 

Mean Corpuscular Volume 85, Mean Corpuscular Hemoglobin 29, Mean Corpuscular 

Hemoglobin Concent 34, Red Cell Distribution Width 13.6, Platelet Count 228, 

Mean Platelet Volume 9.7, Immature Granulocyte % (Auto) 0, Neutrophils (%) 

(Auto) 62, Lymphocytes (%) (Auto) 22, Monocytes (%) (Auto) 13, Eosinophils (%) 

(Auto) 2, Basophils (%) (Auto) 1, Neutrophils # (Auto) 5.3, Lymphocytes # (Auto)

1.9, Monocytes # (Auto) 1.1, Eosinophils # (Auto) 0.2, Basophils # (Auto) 0.1, 

Immature Granulocyte # (Auto) 0.0, Prothrombin Time 12.6, INR Comment 0.9, 

Activated Partial Thromboplast Time 35, D-Dimer 0.34, Sodium Level 141, 

Potassium Level 4.0, Chloride Level 105, Carbon Dioxide Level 24, Anion Gap 12, 

Blood Urea Nitrogen 21, Creatinine 0.86, Estimat Glomerular Filtration Rate 97, 

BUN/Creatinine Ratio 24, Glucose Level 121, Calcium Level 9.7, Corrected Calcium

9.5, Magnesium Level 2.2, Total Bilirubin 0.3, Aspartate Amino Transf (AST/SGOT)

42, Alanine Aminotransferase (ALT/SGPT) 21, Alkaline Phosphatase 85, Troponin I 

3.00, Pro-B-Type Natriuretic Peptide 975.1, Total Protein 7.4, Albumin 4.3


5/16/23 20:50: Troponin I 3.19


5/16/23 21:32: 


Urine Color YELLOW, Urine Clarity CLEAR, Urine pH 6.5, Urine Specific Gravity 

1.020, Urine Protein NEGATIVE, Urine Glucose (UA) NEGATIVE, Urine Ketones 

NEGATIVE, Urine Nitrite NEGATIVE, Urine Bilirubin NEGATIVE, Urine Urobilinogen 

0.2, Urine Leukocyte Esterase NEGATIVE, Urine RBC (Auto) TRACE-I, Urine RBC 2-5,

Urine WBC RARE, Urine Squamous Epithelial Cells NONE, Urine Crystals PRESENT, 

Urine Amorphous Sediment FEW TETO URATES, Urine Bacteria NEGATIVE, Urine Casts 

NONE, Urine Mucus SMALL, Urine Culture Indicated NO, Urine Opiates Screen 

NEGATIVE, Urine Oxycodone Screen NEGATIVE, Urine Methadone Screen NEGATIVE, 

Urine Propoxyphene Screen NEGATIVE, Urine Barbiturates Screen NEGATIVE, Ur 

Tricyclic Antidepressants Screen NEGATIVE, Urine Phencyclidine Screen NEGATIVE, 

Urine Amphetamines Screen NEGATIVE, Urine Methamphetamines Screen NEGATIVE, Uri

ne Benzodiazepines Screen NEGATIVE, Urine Cocaine Screen NEGATIVE, Urine 

Cannabinoids Screen POSITIVE


5/17/23 01:35: Troponin I 6.244


5/17/23 03:59: 


White Blood Count 7.4, Red Blood Count 4.55, Hemoglobin 13.2, Hematocrit 39, 

Mean Corpuscular Volume 86, Mean Corpuscular Hemoglobin 29, Mean Corpuscular 

Hemoglobin Concent 34, Red Cell Distribution Width 13.8, Platelet Count 226, 

Mean Platelet Volume 10.2, Immature Granulocyte % (Auto) 0, Neutrophils (%) 

(Auto) 65, Lymphocytes (%) (Auto) 20, Monocytes (%) (Auto) 12, Eosinophils (%) 

(Auto) 2, Basophils (%) (Auto) 1, Neutrophils # (Auto) 4.8, Lymphocytes # (Auto)

1.5, Monocytes # (Auto) 0.9, Eosinophils # (Auto) 0.2, Basophils # (Auto) 0.1, 

Immature Granulocyte # (Auto) 0.0, Sodium Level 139, Potassium Level 3.8, 

Chloride Level 108, Carbon Dioxide Level 18, Anion Gap 13, Blood Urea Nitrogen 

16, Creatinine 0.74, Estimat Glomerular Filtration Rate 102, BUN/Creatinine 

Ratio 22, Glucose Level 93, Mean Blood Glucose [Pending], Hemoglobin A1c 

[Pending], Calcium Level 9.2, Phosphorus Level 3.3, Magnesium Level 1.9, 

Troponin I 6.157, Triglycerides Level 126, Cholesterol Level 170, LDL 

Cholesterol Direct 112, VLDL Cholesterol 25, HDL Cholesterol 36


5/17/23 08:10: Troponin I 7.036


5/17/23 11:58: Troponin I 8.845








Discharge


Home Medications:





Active Scripts


Active


Lisinopril 40 Mg Tablet 40 Mg PO DAILY 30 Days





Instructions to patient/family


Please see electronic discharge instructions given to patient.





Clinical Quality Measures


AMI/AHF:


ASA po Prior to arrival:  ABRAHAM Smith DO                May 17, 2023 11:19

## 2023-05-17 NOTE — TELE-ICU PROGRESS NOTE
Progress Note


62M with HTN, tobacco abuse - quit 5 days ago admitted with NSTEMI. Has had 1 

month intermittent CP in the upper chest, without radiation. A/w SOB. Worsened 

with exertion - walking up stairs or over longer flat distances. Took ASA prior 

to coming to ED. Initial troponin was elevated to 3.0 with repeat 3.19. EKG was 

reportedly nonischemic, although incomplete RBBB noted (no prior for 

comparison). He was given SL nitro, therapuetic lovenox prior to admission to 

ICU. Labs otherwise unremarkable. 





A/P


- ACS/NSTEMI: took ASAx4 prior to presentation, given therapuetic lovenox in ED.

Trend troponin. Cardiology managing.





- hyperglycemia: marginal elevation at 121 but not fasting. Will check fasting 

in AM along with A1C for risk stratification in the setting of ACS.





Patient assessed  via real time audiovisual communication system. 


CCT 6 min





Focused Exam


Height, Weight, BMI


Height: '"


Weight: lbs. oz. kg; 26.57 BMI


Method:











GERARDO GREENE MD                May 17, 2023 01:10

## 2023-05-17 NOTE — DISCHARGE INST-POST CATH
Discharge Inst-CATH/EP


Problems Reviewed?:  Yes


Post Cardiac Cath/EP D/C Inst


Follow Up/Plan


Transfer to [facility]


<b>CARDIAC CATH/EP PROCEDURE DISCHARGE INSTRUCTIONS</b>





ACTIVITY





* Go Home directly and rest.


* Limit activity of the leg (or wrist if it was used) for 7 days including 

aerobics, swimming,


   jogging, bicycling, etc.


* Restrict stair-climbing for 7 days if possible, if not, climb up with your 

non-cath leg, then


   bring together on the same step.


* Avoid lifting, pushing, pulling or excessive movement of the affected 

extremity for 7 days.


* Customary sexual activity may be resumed after 2 days-use caution not to use a

position  


   that strains or causes pain to the affected extremity.


* No driving for 24 hours.


* NO SMOKING. 


* Avoid straining for bowel movements for 7 days.


* Gentle walking on level ground is allowed.


* Returning to work will depend on the type of procedure and the results. Your 

doctor will discuss


   this with you.





CALL YOUR DOCTOR FOR ANY OF THE FOLLOWING:





*If bleeding from the puncture site occurs- Apply gentle pressure to site with 

clean cloth and call


   your doctor or EMS.


* If a knot or lump forms under the skin, increases in size, or causes pain.


* If bruising appears to be worsening or moving further down your leg instead of

disappearing.


* Temperature above 101 F.





CARE OF YOUR GROIN INCISION;





* Bruising or purple discoloration of the skin near the puncture site is common.


* You may shower only, no bathtub bathing for 5 days.  Be careful to avoid 

slipping as your


   leg may feel stiff.


* If a closure device was used on your femoral artery, please see the attached 

guide regarding


   care of the device and your leg.


* Leave dressing on FOR 24 hours.





CARE OF YOUR WRIST INCISION;





* Bruising or purple discoloration of the skin near the puncture site is common.


* You may shower.


* DO NOT submerge wrist.


* Leave dressing on FOR 24 hours.











SONI BRIGGS MD              May 17, 2023 08:28

## 2023-05-17 NOTE — CONSULTATION-CARDIOLOGY
HPI-Cardiology


Cardiology Consultation


Date of Consultation


5/17/23


Date of Admission





Time Seen by Provider:  07:45


Indication:  Chest pain





HPI


62-year-old gentleman with history of hypertension, tobaccoism, no known 

previous cardiac history, started to have chest pain dull in nature on the left 

side radiating to the left arm for the past 6 months.  Initially was short-lived

and improving then it became more persistent.  Patient reported persistent chest

discomfort for the past 4 weeks getting worse with exertion.  Went to the 

emergency room last night and noted to have elevation in troponin initially 

reported resolution of his chest pain after laying down in bed but now reporting

mild persistent chest discomfort.  Admits having some dyspnea on exertion, dry 

cough.  No palpitation.  No syncope or near syncopal episodes





Home Medications & Allergies


Allergies:  


Coded Allergies:  


     No Known Drug Allergies (Unverified , 10/10/20)


Home Medication List Reviewed:  Yes





PMH-Social-Family Hx


Patient Social History


Marital Status:  


Employed/Student:  employed


Smoking Status:  Current Everyday Smoker


Type Used:  Cigarettes


2nd Hand Smoke Exposure:  No


Recent Hopitalizations:  No


Have you traveled recently?:  No


Alcohol Use?:  Yes





Past Medical History


Hypertension





Family Medical History


Significant Family History:  No Pertinent Family Hx





Review of Systems-General


Review of Systems


Constitutional:  no symptoms reported, see HPI


EENTM:  see HPI, no symptoms reported


Respiratory:  see HPI; No cough; dyspnea on exertion; No hemoptysis, No 

orthopnea, No phlegm, No short of breath, No stridor, No wheezing, No other


Cardiovascular:  see HPI, chest pain; No edema, No Hx of Intervention, No 

palpitations, No syncope, No vascular heart diseas, No other


Gastrointestinal:  no symptoms reported, see HPI


Genitourinary:  no symptoms reported, see HPI


Musculoskeletal:  no symptoms reported


Skin:  no symptoms reported


Psychiatric/Neurological:  No Symptoms Reported





Reviewed Test Results


Reviewed Test Results


Lab





Laboratory Tests








Test


 5/16/23


19:54 5/16/23


20:50 5/16/23


21:32 5/17/23


01:35 Range/Units


 


 


White Blood Count


 8.5 


 


 


 


 4.3-11.0


10^3/uL


 


Red Blood Count


 4.84 


 


 


 


 4.30-5.52


10^6/uL


 


Hemoglobin 13.9     13.3-17.7  g/dL


 


Hematocrit 41     40-54  %


 


Mean Corpuscular Volume 85     80-99  fL


 


Mean Corpuscular Hemoglobin 29     25-34  pg


 


Mean Corpuscular Hemoglobin


Concent 34 


 


 


 


 32-36  g/dL





 


Red Cell Distribution Width 13.6     10.0-14.5  %


 


Platelet Count


 228 


 


 


 


 130-400


10^3/uL


 


Mean Platelet Volume 9.7     9.0-12.2  fL


 


Immature Granulocyte % (Auto) 0      %


 


Neutrophils (%) (Auto) 62     42-75  %


 


Lymphocytes (%) (Auto) 22     12-44  %


 


Monocytes (%) (Auto) 13 H    0-12  %


 


Eosinophils (%) (Auto) 2     0-10  %


 


Basophils (%) (Auto) 1     0-10  %


 


Neutrophils # (Auto)


 5.3 


 


 


 


 1.8-7.8


10^3/uL


 


Lymphocytes # (Auto)


 1.9 


 


 


 


 1.0-4.0


10^3/uL


 


Monocytes # (Auto)


 1.1 H


 


 


 


 0.0-1.0


10^3/uL


 


Eosinophils # (Auto)


 0.2 


 


 


 


 0.0-0.3


10^3/uL


 


Basophils # (Auto)


 0.1 


 


 


 


 0.0-0.1


10^3/uL


 


Immature Granulocyte # (Auto)


 0.0 


 


 


 


 0.0-0.1


10^3/uL


 


Prothrombin Time 12.6     12.2-14.7  SEC


 


INR Comment 0.9     0.8-1.4  


 


Activated Partial


Thromboplast Time 35 


 


 


 


 24-35  SEC





 


D-Dimer


 0.34 


 


 


 


 0.00-0.49


UG/ML


 


Sodium Level 141     135-145  MMOL/L


 


Potassium Level 4.0     3.6-5.0  MMOL/L


 


Chloride Level 105       MMOL/L


 


Carbon Dioxide Level 24     21-32  MMOL/L


 


Anion Gap 12     5-14  MMOL/L


 


Blood Urea Nitrogen 21 H    7-18  MG/DL


 


Creatinine


 0.86 


 


 


 


 0.60-1.30


MG/DL


 


Estimat Glomerular Filtration


Rate 97 


 


 


 


  





 


BUN/Creatinine Ratio 24      


 


Glucose Level 121 H      MG/DL


 


Calcium Level 9.7     8.5-10.1  MG/DL


 


Corrected Calcium 9.5     8.5-10.1  MG/DL


 


Magnesium Level 2.2     1.6-2.4  MG/DL


 


Total Bilirubin 0.3     0.1-1.0  MG/DL


 


Aspartate Amino Transf


(AST/SGOT) 42 H


 


 


 


 5-34  U/L





 


Alanine Aminotransferase


(ALT/SGPT) 21 


 


 


 


 0-55  U/L





 


Alkaline Phosphatase 85       U/L


 


Troponin I 3.00 *H 3.19 *H  6.244 *H <0.028  NG/ML


 


Pro-B-Type Natriuretic Peptide 975.1 H    <125.0  PG/ML


 


Total Protein 7.4     6.4-8.2  GM/DL


 


Albumin 4.3     3.2-4.5  GM/DL


 


Urine Color   YELLOW    


 


Urine Clarity   CLEAR    


 


Urine pH   6.5   5-9  


 


Urine Specific Gravity   1.020   1.016-1.022  


 


Urine Protein   NEGATIVE   NEGATIVE  


 


Urine Glucose (UA)   NEGATIVE   NEGATIVE  


 


Urine Ketones   NEGATIVE   NEGATIVE  


 


Urine Nitrite   NEGATIVE   NEGATIVE  


 


Urine Bilirubin   NEGATIVE   NEGATIVE  


 


Urine Urobilinogen   0.2   < = 1.0  MG/DL


 


Urine Leukocyte Esterase   NEGATIVE   NEGATIVE  


 


Urine RBC (Auto)   TRACE-I H  NEGATIVE  


 


Urine RBC   2-5 H   /HPF


 


Urine WBC   RARE    /HPF


 


Urine Squamous Epithelial


Cells 


 


 NONE 


 


  /HPF





 


Urine Crystals   PRESENT H   /LPF


 


Urine Amorphous Sediment


 


 


 FEW TETO


URATES H 


  /LPF





 


Urine Bacteria   NEGATIVE    /HPF


 


Urine Casts   NONE    /LPF


 


Urine Mucus   SMALL H   /LPF


 


Urine Culture Indicated   NO    


 


Urine Opiates Screen   NEGATIVE   NEGATIVE  


 


Urine Oxycodone Screen   NEGATIVE   NEGATIVE  


 


Urine Methadone Screen   NEGATIVE   NEGATIVE  


 


Urine Propoxyphene Screen   NEGATIVE   NEGATIVE  


 


Urine Barbiturates Screen   NEGATIVE   NEGATIVE  


 


Ur Tricyclic Antidepressants


Screen 


 


 NEGATIVE 


 


 NEGATIVE  





 


Urine Phencyclidine Screen   NEGATIVE   NEGATIVE  


 


Urine Amphetamines Screen   NEGATIVE   NEGATIVE  


 


Urine Methamphetamines Screen   NEGATIVE   NEGATIVE  


 


Urine Benzodiazepines Screen   NEGATIVE   NEGATIVE  


 


Urine Cocaine Screen   NEGATIVE   NEGATIVE  


 


Urine Cannabinoids Screen   POSITIVE H  NEGATIVE  


 


Test


 5/17/23


03:59 


 


 


 Range/Units


 


 


White Blood Count


 7.4 


 


 


 


 4.3-11.0


10^3/uL


 


Red Blood Count


 4.55 


 


 


 


 4.30-5.52


10^6/uL


 


Hemoglobin 13.2 L    13.3-17.7  g/dL


 


Hematocrit 39 L    40-54  %


 


Mean Corpuscular Volume 86     80-99  fL


 


Mean Corpuscular Hemoglobin 29     25-34  pg


 


Mean Corpuscular Hemoglobin


Concent 34 


 


 


 


 32-36  g/dL





 


Red Cell Distribution Width 13.8     10.0-14.5  %


 


Platelet Count


 226 


 


 


 


 130-400


10^3/uL


 


Mean Platelet Volume 10.2     9.0-12.2  fL


 


Immature Granulocyte % (Auto) 0      %


 


Neutrophils (%) (Auto) 65     42-75  %


 


Lymphocytes (%) (Auto) 20     12-44  %


 


Monocytes (%) (Auto) 12     0-12  %


 


Eosinophils (%) (Auto) 2     0-10  %


 


Basophils (%) (Auto) 1     0-10  %


 


Neutrophils # (Auto)


 4.8 


 


 


 


 1.8-7.8


10^3/uL


 


Lymphocytes # (Auto)


 1.5 


 


 


 


 1.0-4.0


10^3/uL


 


Monocytes # (Auto)


 0.9 


 


 


 


 0.0-1.0


10^3/uL


 


Eosinophils # (Auto)


 0.2 


 


 


 


 0.0-0.3


10^3/uL


 


Basophils # (Auto)


 0.1 


 


 


 


 0.0-0.1


10^3/uL


 


Immature Granulocyte # (Auto)


 0.0 


 


 


 


 0.0-0.1


10^3/uL


 


Sodium Level 139     135-145  MMOL/L


 


Potassium Level 3.8     3.6-5.0  MMOL/L


 


Chloride Level 108 H      MMOL/L


 


Carbon Dioxide Level 18 L    21-32  MMOL/L


 


Anion Gap 13     5-14  MMOL/L


 


Blood Urea Nitrogen 16     7-18  MG/DL


 


Creatinine


 0.74 


 


 


 


 0.60-1.30


MG/DL


 


Estimat Glomerular Filtration


Rate 102 


 


 


 


  





 


BUN/Creatinine Ratio 22      


 


Glucose Level 93       MG/DL


 


Calcium Level 9.2     8.5-10.1  MG/DL


 


Phosphorus Level 3.3     2.3-4.7  MG/DL


 


Magnesium Level 1.9     1.6-2.4  MG/DL


 


Troponin I 6.157 *H    <0.028  NG/ML


 


Triglycerides Level 126     <150  MG/DL


 


Cholesterol Level 170     < 200  MG/DL


 


LDL Cholesterol Direct 112     1-129  MG/DL


 


VLDL Cholesterol 25     5-40  MG/DL


 


HDL Cholesterol 36 L    40-60  MG/DL











Physical Exam


Physical Exam


Vital Signs





Vital Signs - First Documented








 5/16/23





 19:51


 


Temp 37.0


 


Pulse 85


 


Resp 20


 


B/P (MAP) 142/92 (109)


 


Pulse Ox 98


 


O2 Delivery Room Air





Capillary Refill : Less Than 3 Seconds


Height, Weight, BMI


Height: '"


Weight: lbs. oz. kg; 26.57 BMI


Method:


General Appearance:  No Apparent Distress, WD/WN, Anxious


HEENT:  PERRL/EOMI


Neck:  Full Range of Motion


Respiratory:  Chest Non Tender, Lungs Clear, Normal Breath Sounds, No Accessory 

Muscle Use, No Respiratory Distress


Cardiovascular:  Regular Rate, Rhythm, No Edema


Gastrointestinal:  Normal Bowel Sounds, Non Tender, Soft


Extremity:  Normal Range of Motion


Neurologic/Psychiatric:  Alert, Oriented x3, No Motor/Sensory Deficits, Normal 

Mood/Affect


Skin:  Normal Color





A/P-Cardiology


Admission Diagnosis


Unstable angina


Non-ST elevation myocardial infarction


Coronary artery disease


Hypertension


Hyperlipidemia





Assessment/Plan


Chest pain, unstable angina


Becoming more persistent active chest pain


No acute EKG changes


Elevated troponin level, planning for cardiac catheterization possible PTCA





Coronary artery disease, non-ST elevation myocardial infarction with persistent 

elevation in troponin


Planning to proceed with cardiac catheterization





Hypertension, maintained on lisinopril





Hyperlipidemia, I will start statin





Tobaccoism, educated on smoking cessation





Clinical Quality Measures


AMI/AHF:


ASA po Prior to arrival:  SONI Villaseñor MD              May 17, 2023 07:48